# Patient Record
Sex: MALE | Race: WHITE
[De-identification: names, ages, dates, MRNs, and addresses within clinical notes are randomized per-mention and may not be internally consistent; named-entity substitution may affect disease eponyms.]

---

## 2019-04-13 ENCOUNTER — HOSPITAL ENCOUNTER (EMERGENCY)
Dept: HOSPITAL 41 - JD.ED | Age: 29
Discharge: HOME | End: 2019-04-13
Payer: SELF-PAY

## 2019-04-13 VITALS — DIASTOLIC BLOOD PRESSURE: 80 MMHG | SYSTOLIC BLOOD PRESSURE: 135 MMHG

## 2019-04-13 DIAGNOSIS — H10.33: Primary | ICD-10-CM

## 2019-04-13 DIAGNOSIS — F17.210: ICD-10-CM

## 2019-04-13 NOTE — EDM.PDOC
ED HPI GENERAL MEDICAL PROBLEM





- General


Chief Complaint: Eye Problems


Stated Complaint: EYE PROBLEM


Time Seen by Provider: 04/13/19 21:36


Source of Information: Reports: Patient





- History of Present Illness


INITIAL COMMENTS - FREE TEXT/NARRATIVE: 


Alfredo Waite presents to our ED with bilateral eye pain and drainage.  Reports 

symptoms started in his left eye when he woke this morning they been itchy and 

painful.  He is also noticed some greenish yellow discharge.  Reports symptoms 

then moved to his right eye although his left eye is still worse.  He has no 

known sick contacts and reports that no but he's been around has had pinkeye 

that he is aware of.  Denies any other infectious symptoms.  He has no 

allergies.  He has no medications.  He does not wear contact lenses.  Denies 

any nausea, vomiting, chest pain, shortness of breath, congestion, ear pain.








- Related Data


 Allergies











Allergy/AdvReac Type Severity Reaction Status Date / Time


 


No Known Allergies Allergy   Verified 04/13/19 21:28











Home Meds: 


 Home Meds





Tobramycin 0.3% [Tobramycin 0.3% Ophth Soln] 1 drop OP QID 7 Days #1 bottle 04/ 13/19 [Rx]











Past Medical History





- Past Surgical History


GI Surgical History: Reports: Hernia, Abdominal


Male  Surgical History: Reports: Other (See Below)





Social & Family History





- Tobacco Use


Smoking Status *Q: Current Every Day Smoker


Years of Tobacco use: 13


Packs/Tins Daily: 2





- Caffeine Use


Caffeine Use: Reports: Coffee





- Recreational Drug Use


Recreational Drug Use: No





- Living Situation & Occupation


Living situation: Reports: Single, with Family





ED ROS GENERAL





- Review of Systems


Review Of Systems: ROS reveals no pertinent complaints other than HPI.





ED EXAM GENERAL W FULL EYE





- Physical Exam


Exam: See Below


Exam Limited By: No Limitations


General Appearance: Alert, WD/WN, No Apparent Distress


Eye Exam: Bilateral Eye: Conjunctival Injection, EOMI, PERRL, Other (

Bilaterally eyes are very watery.  There is some greenish discharge noted more 

prevalent on left than right.)


Ears: Normal External Exam


Nose: Normal Inspection





Course





- Vital Signs


Last Recorded V/S: 





 Last Vital Signs











Temp  98.4 F   04/13/19 21:24


 


Pulse  76   04/13/19 21:24


 


Resp  16   04/13/19 21:24


 


BP  135/80   04/13/19 21:24


 


Pulse Ox  95   04/13/19 21:24














Departure





- Departure


Time of Disposition: 21:39


Disposition: Home, Self-Care 01


Clinical Impression: 


Conjunctivitis


Qualifiers:


 Conjunctivitis type: acute Acute conjunctivitis type: unspecified Laterality: 

bilateral Qualified Code(s): H10.33 - Unspecified acute conjunctivitis, 

bilateral








- Discharge Information


*PRESCRIPTION DRUG MONITORING PROGRAM REVIEWED*: No


*COPY OF PRESCRIPTION DRUG MONITORING REPORT IN PATIENT STEPHANIE: No


Prescriptions: 


Tobramycin 0.3% [Tobramycin 0.3% Ophth Soln] 1 drop OP QID 7 Days #1 bottle


Instructions:  Bacterial Conjunctivitis, Easy-to-Read, How to Use Eye Drops and 

Eye Ointments


Referrals: 


PCP,None [Primary Care Provider] - 


Additional Instructions: 


You presented to our ED today and were diagnosed with bilateral conjunctivitis, 

also known as "pink eye."  An antibiotic eyedrop was sent to the pharmacy in 

family fare.  He should take this antibiotic 4 times a day into both of your 

eyes being careful not to touch her eyeball with the and of the bottle.  He 

should continue this for 7 days.  Be sure to wash her hands frequently and 

avoid itching or rubbing her eyes as this is easily spread.  Should symptoms 

continue or worsen return to the ED or contact your primary care provider.

## 2020-02-10 NOTE — CR
Chest: 2 views of the chest are obtained.

 

Comparison: Prior chest x-ray of 03/06/14.

 

Heart size and mediastinum are normal.  Lungs are clear with no acute 

parenchymal change.  Bony structures appear within normal limits for 

the patient's age.

 

Impression:

1.  Nothing acute is seen on 2 view chest x-ray.

 

Diagnostic code #1

 

Study was dictated in [place] Standard Time

## 2020-02-10 NOTE — EDM.PDOC
<Daisy Real - Last Filed: 02/10/20 18:07>





ED HPI GENERAL MEDICAL PROBLEM





- General


Chief Complaint: Respiratory Problem


Stated Complaint: SOB


Time Seen by Provider: 02/10/20 17:37


Source of Information: Reports: Patient


History Limitations: Reports: No Limitations





- History of Present Illness


INITIAL COMMENTS - FREE TEXT/NARRATIVE: 





Patient is a pleasant 29-year-old male who presents with complaints of 

shortness of breath and a dry cough that has been present for 3-4 weeks.  He 

states he started having the trouble with his breathing soon after he quit 

smoking cigarettes one month ago.  When he quit smoking cigarettes, he started 

vaping and states he probably vapes more nicotine than he did when he smoked 

the cigarettes.  He states the shortness of breath worsens when he lays flat 

and that the shortness of breath wakes him up at night.  He reports chest pain 

due to the coughing episodes he has been experiencing.  He reports he has been 

using his mother's albuterol nebulizers every 6 hours for the shortness of 

breath for the past few weeks and has used her albuterol inhaler a few times as 

well.  He does report a history of asthma when he was a child but has not had 

to use an albuterol inhaler in over eight years.  Denies fever, chills, and 

abdominal pain. 


Onset: Gradual


Duration: Week(s):, Constant





- Related Data


 Allergies











Allergy/AdvReac Type Severity Reaction Status Date / Time


 


No Known Allergies Allergy   Verified 04/13/19 21:28











Home Meds: 


 Home Meds





Tobramycin 0.3% [Tobramycin 0.3% Ophth Soln] 1 drop OP QID 7 Days #1 bottle 04/ 13/19 [Rx]


predniSONE [Prednisone] 40 mg PO DAILY #10 tablet 02/10/20 [Rx]











Past Medical History





- Past Surgical History


GI Surgical History: Reports: Hernia, Abdominal


Male  Surgical History: Reports: Other (See Below)





Social & Family History





- Caffeine Use


Caffeine Use: Reports: Coffee





- Living Situation & Occupation


Living situation: Reports: Single, with Family





ED ROS GENERAL





- Review of Systems


Review Of Systems: See Below


Constitutional: Reports: No Symptoms.  Denies: Fever, Chills, Weakness, Fatigue


HEENT: Reports: No Symptoms.  Denies: Throat Pain


Respiratory: Reports: Shortness of Breath, Cough (non-productive).  Denies: 

Wheezing, Hemoptysis


Cardiovascular: Reports: Chest Pain (with coughing).  Denies: Edema, 

Lightheadedness, Syncope


GI/Abdominal: Reports: No Symptoms.  Denies: Abdominal Pain, Diarrhea, Nausea, 

Vomiting


Musculoskeletal: Reports: No Symptoms.  Denies: Neck Pain, Back Pain


Skin: Reports: No Symptoms.  Denies: Rash, Erythema


Neurological: Reports: No Symptoms.  Denies: Dizziness, Headache, Syncope


Psychiatric: Reports: No Symptoms





ED EXAM, GENERAL





- Physical Exam


Exam: See Below


Exam Limited By: No Limitations


General Appearance: Alert, WD/WN, No Apparent Distress


Head: Atraumatic, Normocephalic


Neck: Normal Inspection, Supple, Non-Tender, Full Range of Motion


Respiratory/Chest: No Respiratory Distress, No Accessory Muscle Use, Chest Non-

Tender.  No: Rales, Rhonchi, Wheezing (at end of expiration in upper lobes)


Cardiovascular: Normal Peripheral Pulses, Regular Rate, Rhythm, No Edema, No 

Gallop, No Murmur, No Rub


GI/Abdominal: Normal Bowel Sounds, Soft, Non-Tender, No Organomegaly, No 

Distention, No Mass


Back Exam: Normal Inspection, Full Range of Motion, NT


Extremities: Normal Inspection, Normal Range of Motion, Non-Tender, Normal 

Capillary Refill, No Pedal Edema


Neurological: Alert, Oriented, Normal Cognition, Normal Gait, No Motor/Sensory 

Deficits


Psychiatric: Normal Affect, Normal Mood


Skin Exam: Warm, Dry, Intact, Normal Color, No Rash


Lymphatic: No Adenopathy





Course





- Vital Signs


Last Recorded V/S: 





 Last Vital Signs











Temp  98.5 F   02/10/20 17:02


 


Pulse  79   02/10/20 17:02


 


Resp  16   02/10/20 17:02


 


BP  111/77   02/10/20 17:02


 


Pulse Ox  100   02/10/20 18:08














- Orders/Labs/Meds


Orders: 





 Active Orders 24 hr











 Category Date Time Status


 


 RT Aerosol Therapy [RC] ASDIRECTED Care  02/10/20 17:57 Active











Meds: 





Medications














Discontinued Medications














Generic Name Dose Route Start Last Admin





  Trade Name Freq  PRN Reason Stop Dose Admin


 


Albuterol/Ipratropium  3 ml  02/10/20 17:57  02/10/20 18:07





  Duoneb 3.0-0.5 Mg/3 Ml  NEB  02/10/20 17:58  3 ml





  ONETIME ONE   Administration





     





     





     





     


 


Prednisone  40 mg  02/10/20 17:57  02/10/20 18:04





  Prednisone  PO  02/10/20 17:58  40 mg





  ONETIME ONE   Administration





     





     





     





     














Departure





- Departure


Disposition: Home, Self-Care 01


Clinical Impression: 


Reactive airway disease


Qualifiers:


 Asthma severity: moderate Asthma persistence: persistent Asthma complication 

type: with acute exacerbation Qualified Code(s): J45.41 - Moderate persistent 

asthma with (acute) exacerbation








- Discharge Information


Prescriptions: 


predniSONE [Prednisone] 40 mg PO DAILY #10 tablet


Referrals: 


PCP,None [Primary Care Provider] - 


Sonia Lee PA-C [Physician Assistant] - 1 Week


Forms:  ED Department Discharge


Additional Instructions: 


Try to stop vaping.  Take the prednisone 40mg daily for 5 days starting 

tomorrow.  Use the albuterol nebs every 6 hours.  Please return if you are 

worse.





Sepsis Event Note





- Evaluation


Sepsis Screening Result: No Definite Risk





- Focused Exam


Vital Signs: 





 Vital Signs











  Temp Pulse Resp BP Pulse Ox Pulse Ox


 


 02/10/20 18:08       100


 


 02/10/20 17:02  98.5 F  79  16  111/77  97 











Date Exam was Performed: 02/10/20


Time Exam was Performed: 18:07





- My Orders


Last 24 Hours: 





My Active Orders





02/10/20 17:57


RT Aerosol Therapy [RC] ASDIRECTED 














- Assessment/Plan


Last 24 Hours: 





My Active Orders





02/10/20 17:57


RT Aerosol Therapy [RC] ASDIRECTED 














<Olu Mcneil - Last Filed: 02/10/20 19:46>





Course





- Re-Assessments/Exams


Free Text/Narrative Re-Assessment/Exam: 





02/10/20 19:39


I examined the patient myself and I agree with Daisy's assessment and plan.  I 

ordered a duoneb, prednisone and a CXR.  His CXR looks good.  I will need to 

get him on some prednisone.





Departure





- Departure


Time of Disposition: 19:45


Condition: Good





- Discharge Information


*PRESCRIPTION DRUG MONITORING PROGRAM REVIEWED*: Not Applicable


*COPY OF PRESCRIPTION DRUG MONITORING REPORT IN PATIENT STEPHANIE: Not Applicable





Sepsis Event Note





- Focused Exam


Date Exam was Performed: 02/10/20


Time Exam was Performed: 19:39

## 2020-07-27 NOTE — EDM.PDOC
ED HPI GENERAL MEDICAL PROBLEM





- General


Chief Complaint: Respiratory Problem


Stated Complaint: SOB


Time Seen by Provider: 07/27/20 11:05


Source of Information: Reports: Patient


History Limitations: Reports: No Limitations





- History of Present Illness


INITIAL COMMENTS - FREE TEXT/NARRATIVE: 


30-year-old male presents to the ED with significant wheezing and dyspnea at 

rest.  Patient symptoms date back to at least December of last year.  He was 

worse in the month of January when he was exposed to cool air.  He was seen in 

the ED on the month of February and prescribed a short 5-day course of steroids 

which did help immensely.  Patient states that over the last few days he has 

become more short of breath and wheezy.  Albuterol nebulizer machine at home as 

well as metered-dose inhaler have not been helping resolve the wheezing.  He 

denies any upper respiratory tract infection cough or sputum production.  No 

known exposure to COVID-19 virus.  He reports he is still smoking a pack per day

of cigarettes.  Does have a mild smoker's cough.  He has an appointment to see 

Dr. Augusto Sears in 3 days time but due to inability to sleep all night and 

increased shortness of breath elected to come to the ED for evaluation.  O2 sats

were 93 to 95% on room air.





Onset: Gradual


Onset Date: 07/25/20 (Tonic problems with asthma symptoms i.e. shortness of 

breath and wheezing much worse the last 3 days.)


Duration: Chronic, Getting Worse


Location: Reports: Chest (Rest of breath with audible wheezing.)


Quality: Reports: Pressure (Central pressure in upper chest.)


Severity: Severe


Improves with: Reports: Rest, Other (Transient relief of symptoms with albuterol

metered-dose inhaler and home nebulizer albuterol.)


Worsens with: Reports: Other


Context: Denies: Activity, Exercise (This with exertion.), Lifting, Sick 

Contact, Trauma, Other


Associated Symptoms: Reports: Chest Pain, Cough (Nonproductive cough related to 

smoking.), Malaise, Shortness of Breath.  Denies: No Other Symptoms, Confusion 

(Trolled chest pressure discomfort), cough w sputum, Diaphoresis, Fever/Chills, 

Headaches, Loss of Appetite, Nausea/Vomiting, Rash (Not sleeping all night), 

Seizure, Syncope, Weakness


Treatments PTA: Reports: Other (see below) (Butyryl metered-dose inhaler and 

home nebulizer treatment 6 times daily)





- Related Data


                                    Allergies











Allergy/AdvReac Type Severity Reaction Status Date / Time


 


No Known Allergies Allergy   Verified 07/27/20 10:19











Home Meds: 


                                    Home Meds





Albuterol Sulfate [Albuterol Sulfate Hfa] 2 puff INH 6XDAY PRN 07/27/20 

[History]


Albuterol [Proventil Neb Soln] 3 ml NEB 6XDAY PRN 07/27/20 [History]


Fluticasone Propionate [Flovent  MCG] 2 puff INH BID #1 inhaler 07/27/20 

[Rx]


predniSONE [Prednisone] 20 mg PO ASDIRECTED #18 tablet 07/27/20 [Rx]











Past Medical History


Respiratory History: Reports: Asthma


Other Respiratory History: asthma as a child





- Past Surgical History


GI Surgical History: Reports: Hernia, Abdominal


Male  Surgical History: Reports: Other (See Below)


Musculoskeletal Surgical History: Reports: Other (See Below)


Other Musculoskeletal Surgeries/Procedures:: Surgery to finger on left hand





Social & Family History





- Family History


Family Medical History: Noncontributory





- Tobacco Use


Smoking Status *Q: Current Every Day Smoker


Years of Tobacco use: 14


Packs/Tins Daily: 1





- Caffeine Use


Caffeine Use: Reports: Coffee, Energy Drinks, Soda, Tea





- Recreational Drug Use


Recreational Drug Use: Yes


Drug Use in Last 12 Months: No


Recreational Drug Type: Reports: Marijuana/Hashish


Recreational Drug Use Frequency: Not Used In Over 6 Months





- Living Situation & Occupation


Living situation: Reports: Single, with Family





ED ROS GENERAL





- Review of Systems


Review Of Systems: See Below


Constitutional: Reports: Malaise, Fatigue, Decreased Appetite (Not sleeping all 

night.).  Denies: Fever, Chills


HEENT: Reports: No Symptoms


Respiratory: Reports: Shortness of Breath, Wheezing, Cough.  Denies: Pleuritic 

Chest Pain, Sputum, Hemoptysis (No worse than usual.  Related to smoking.)


Cardiovascular: Reports: Chest Pain, Dyspnea on Exertion.  Denies: Blood 

Pressure Problem (Central chest pressure discomfort.), Claudication, Edema, 

Lightheadedness, Orthopnea, Palpitations


Endocrine: Reports: Fatigue


GI/Abdominal: Reports: No Symptoms


: Reports: No Symptoms


Musculoskeletal: Reports: No Symptoms


Skin: Reports: No Symptoms


Neurological: Reports: No Symptoms


Psychiatric: Reports: No Symptoms


Hematologic/Lymphatic: Reports: No Symptoms


Immunologic: Reports: No Symptoms





ED EXAM, GENERAL





- Physical Exam


Exam: See Below


Exam Limited By: No Limitations


General Appearance: Alert, WD/WN, Moderate Distress, Other (Bigelow distress.  

She is 36.6.  Heart rate 65 and sinus respiratory of 18 with sats of 93 to 95% 

on room air.  /79.)


Eye Exam: Bilateral Eye: Normal Inspection, PERRL


Ears: Normal TMs


Nose: Nasal Flaring


Throat/Mouth: Normal Inspection, Normal Lips, Normal Oropharynx, Other (Normal 

sized tonsils.)


Head: Atraumatic, Normocephalic


Neck: Normal Inspection, Supple, Non-Tender, Full Range of Motion.  No: Carotid 

Bruit, Lymphadenopathy (L), Lymphadenopathy (R)


Respiratory/Chest: No Accessory Muscle Use, Chest Non-Tender, Respiratory 

Distress, Decreased Breath Sounds (Lungs are diminished to the lower 40% of lung

 fields bilaterally.), Wheezing (Diffuse wheezing in all lung fields on 

expiration).  No: Lungs Clear, Normal Breath Sounds


Cardiovascular: Normal Peripheral Pulses, Regular Rate, Rhythm, No Edema, No 

Gallop, No Murmur, No Rub


Peripheral Pulses: 3+: Carotid (L), Carotid (R), Posterior Tibial (L), Posterior

 Tibial (R), Dorsalis Pedis (L), Dorsalis Pedis (R)


GI/Abdominal: Normal Bowel Sounds, Soft, Non-Tender, No Organomegaly, No 

Abnormal Bruit, No Mass, Pelvis Stable, Other (No surgical scars)


 (Male) Exam: No Hernia


Back Exam: Normal Inspection, Full Range of Motion.  No: CVA Tenderness (L), CVA

 Tenderness (R)


Extremities: Normal Inspection, Normal Range of Motion, Non-Tender, No Pedal 

Edema


Neurological: Alert, Oriented, CN II-XII Intact, Normal Cognition


Psychiatric: Normal Affect, Normal Mood


Skin Exam: Warm, Dry, Intact, Normal Color, No Rash





Course





- Vital Signs


Last Recorded V/S: 


                                Last Vital Signs











Temp  36.6 C   07/27/20 10:14


 


Pulse  65   07/27/20 10:14


 


Resp  18   07/27/20 10:14


 


BP  114/79   07/27/20 10:14


 


Pulse Ox  90 L  07/27/20 12:07














- Orders/Labs/Meds


Orders: 


                               Active Orders 24 hr











 Category Date Time Status


 


 RT Aerosol Therapy [RC] ASDIRECTED Care  07/27/20 11:21 Active


 


 Chest 1V Frontal [CR] Stat Exams  07/27/20 11:22 Taken











Meds: 


Medications














Discontinued Medications














Generic Name Dose Route Start Last Admin





  Trade Name Percy  PRN Reason Stop Dose Admin


 


Albuterol/Ipratropium  3 ml  07/27/20 11:21  07/27/20 12:53





  Duoneb 3.0-0.5 Mg/3 Ml  NEB   3 ml





  Q4H PRN   Administration





  Shortness Of Breath/wheezing  


 


Prednisone  30 mg  07/27/20 11:26  07/27/20 12:31





  Prednisone  PO  07/27/20 11:27  30 mg





  ONETIME ONE   Administration














- Radiology Interpretation


Free Text/Narrative:: 


 30-year-old male resents to the ED with acute exacerbation of what sounds like 

chronic asthma.  Sounds like he has been having symptoms of asthma with wheezing

 shortness of breath attacks since December of last year.  In spite of this he 

continues to smoke a pack of cigarettes per day..  Symptoms are much worse over 

the last 3 days.  He was up all night due to dyspnea and inability to lie flat 

due to shortness of breath.  No improvement with home nebulizer medicine every 4

 hours i.e. albuterol.  Metered-dose inhaler has not been helping at all either.

  Patient has been placed on one course of steroids in February of this year for

 5 days which did improve his symptoms.  Minimal cough at this time he is 

afebrile.  O2 sats are 93 to 95% on room air.  Exam reveals him to be wheezing 

throughout all lung fields with decreased air entry to both lower lung fields 

posteriorly.  Plan 1 view chest x-ray to be done.  Pulmonary function studies to

 be done.  He will have a DuoNeb treatment now and likely require repeat in 20 

minutes or so.  Prednisone 30 mg by mouth to be given now.








- Re-Assessments/Exams


Free Text/Narrative Re-Assessment/Exam: 





07/27/20 13:00: He is feeling much improved after being treated with DuoNeb and 

albuterol during his lung function studies.  Function studies are definitely 

positive for obstructive airway disease.  The FVC, FEV1, FEV1, FVC ratio and FEF

 F 25 to 75% of predicted normal there was a significant response to 

administered albuterol.  And the patient will be discharged on Flovent 110 

mcg/puff.  He will use 2 puffs morning and bedtime until he regains control of 

his asthma.  He is to continue use albuterol either via metered-dose inhaler or 

nebulizer as needed every 2 hours and.  Placed on prednisone 20 mg twice daily 

for the next 6 days and then 1 tablet the morning only for another 6 days to 

bring acute relief to his dyspnea and wheezing.  He is strongly encouraged that 

he must stop smoking as he has the lungs of an 82-year-old at present.  Advised 

to follow-up with Dr. Augusto Sears in 3 days time as planned as he will need 

someone to  refill his medications and monitor his response to steroid therapy. 

 there are financial barriers to his treatment plan as he does not have medical 

insurance at this time.








Departure





- Departure


Time of Disposition: 12:50


Disposition: Home, Self-Care 01


Condition: Fair


Clinical Impression: 


 Asthma attacks lasting more than 24 hours








- Discharge Information


*PRESCRIPTION DRUG MONITORING PROGRAM REVIEWED*: Not Applicable


*COPY OF PRESCRIPTION DRUG MONITORING REPORT IN PATIENT STEPHANIE: Not Applicable


Prescriptions: 


Fluticasone Propionate [Flovent  MCG] 2 puff INH BID #1 inhaler


predniSONE [Prednisone] 20 mg PO ASDIRECTED #18 tablet


Instructions:  Steps to Quit Smoking, Easy-to-Read, Asthma, Adult


Referrals: 


Augusto Sears Jr, MD [Primary Care Provider] - 


Forms:  ED Department Discharge


Additional Instructions: 


[facility] in the emergency room today in regards to symptoms of asthma dating back to 

at least December of last year.  Intermittent worsening of symptoms particularly

over the last 3 days and particular last night where you are unable to sleep at 

all due to shortness of breath and wheezing.  Emanation reveals expiratory 

wheezing throughout all lung fields with O2 sats of 93 to 95% on room air with 

normal being 97 to 98%.  Kehinde function studies reveal significant obstructive 

airway disease confirming fairly severe asthma.  Treated with DuoNeb and 

albuterol while in the ED.  Modest improvement did occur with these medications 

in regards to airflow in your lungs.  Initial dose of prednisone was given in 

the ED 30 mg by mouth.  You need another 20 mg tonight at bedtime about 10 or 

11:00 tonight.  After this it is to be taken twice daily with breakfast and 

supper for the next 6 days and then once in the morning only for another 6 days 

to bring asthma symptoms under control.  You are also going to need a steroid 

inhaler and suggest Flovent 110 mcg inhaler using 2 puffs in the morning and 2 

puffs at bedtime to bring your asthma under control long-term.  It should get to

the point where you would rarely need your rescue inhaler or have to use your 

home nebulizer for treatment.  It should also mean you never get up at nighttime

to use your inhaler be due to shortness of breath.  Suggest follow-up with Dr. Sears as planned in 3 days time as he will have to follow you along and make 

sure that you respond to the above treatment and to refill medications as 

needed.





Sepsis Event Note (ED)





- Evaluation


Sepsis Screening Result: No Definite Risk





- Focused Exam


Vital Signs: 


                                   Vital Signs











  Temp Pulse Resp BP Pulse Ox Pulse Ox


 


 07/27/20 12:07       90 L


 


 07/27/20 10:14  36.6 C  65  18  114/79  94 L 














- My Orders


Last 24 Hours: 


My Active Orders





07/27/20 11:21


RT Aerosol Therapy [RC] ASDIRECTED 





07/27/20 11:22


Chest 1V Frontal [CR] Stat 














- Assessment/Plan


Last 24 Hours: 


My Active Orders





07/27/20 11:21


RT Aerosol Therapy [RC] ASDIRECTED 





07/27/20 11:22


Chest 1V Frontal [CR] Stat

## 2020-07-27 NOTE — CR
Chest: PA view of the chest was obtained.

 

Comparison: Prior chest x-ray of 02/10/20.

 

Heart size and mediastinum are normal.  Lungs are clear with no acute 

parenchymal change.  Bony structures are grossly intact.

 

Impression:

1.  Nothing acute is seen on PA chest x-ray.

 

Diagnostic code #1

 

This report was dictated in MDT

## 2021-05-05 NOTE — EDM.PDOC
ED HPI GENERAL MEDICAL PROBLEM





- General


Chief Complaint: Skin Complaint


Stated Complaint: SWOLLEN BOTTOM LIP


Time Seen by Provider: 05/05/21 06:40


Source of Information: Reports: Patient


History Limitations: Reports: No Limitations





- History of Present Illness


INITIAL COMMENTS - FREE TEXT/NARRATIVE: 





Patient arrived ED by private vehicle





Complains of swelling of the lower lip


States that he squeezed a small pimple underneath the right lower lip 2 days ago


The next day he awoke with swelling of the right lower lip


Overnight the swelling has spread across the lower lip


Pain severity is rated "between 4 and 6"


Denies dyspnea, dysphagia, or swelling of tongue or throat


Denies fever, nausea, vomiting


Denies prior history of similar symptoms








  ** Lip


Pain Score (Numeric/FACES): 3





- Related Data


                                    Allergies











Allergy/AdvReac Type Severity Reaction Status Date / Time


 


No Known Allergies Allergy   Verified 05/06/21 10:34











Home Meds: 


                                    Home Meds





Albuterol Sulfate [Albuterol Sulfate Hfa] 2 puff INH 6XDAY PRN 07/27/20 

[History]


Albuterol [Proventil Neb Soln] 3 ml NEB 6XDAY PRN 07/27/20 [History]


Fluticasone Propionate [Flovent  MCG] 2 puff INH BID #1 inhaler 07/27/20 

[Rx]


Amoxicillin/Clavulanate K [Augmentin 875-125 MG] 1 tab PO Q12H #14 tab 05/05/21 

[Rx]


Escitalopram Oxalate [Lexapro] 20 mg PO BEDTIME 05/05/21 [History]


oxyCODONE HCl/Acetaminophen [Percocet 5-325 mg Tablet] 1 - 2 each PO Q4H PRN #12

tablet 05/06/21 [Rx]











Past Medical History


Respiratory History: Reports: Asthma


Other Respiratory History: asthma as a child





- Past Surgical History


GI Surgical History: Reports: Hernia, Abdominal


Male  Surgical History: Reports: Other (See Below)


Other Male  Surgeries/Procedures: Undescended testicle


Musculoskeletal Surgical History: Reports: Other (See Below)


Other Musculoskeletal Surgeries/Procedures:: Surgery to finger on left hand





Social & Family History





- Family History


Family Medical History: No Pertinent Family History





- Caffeine Use


Caffeine Use: Reports: Coffee





- Recreational Drug Use


Recreational Drug Use: Yes


Recreational Drug Type: Reports: Marijuana/Hashish





- Living Situation & Occupation


Living situation: Reports: Single, with Family





ED ROS GENERAL





- Review of Systems


Review Of Systems: See Below


Free Text/Narrative/Comment: 





Constitutional - no fever


ENT - no rhinorrhea; no congestion; no epistaxis; lip swelling; no dental pain; 

no throat or tongue swelling


Respiratory - no shortness of breath


Gastrointestinal - no nausea; no vomiting


Integumentary - no rash; no urticaria











ED EXAM, SKIN/RASH


Exam: See Below


Text/Narrative:: 





Constitutional - awake; alert; no acute distress


Head - no facial swelling or weakness


Eyes - extra ocular motion intact; conjunctiva normal


ENT - no nasal deformity; no epistaxis; normal phonation; oropharynx normal; 

moderate, diffuse swelling of lower lip with palpable nodular or cystic lesion 

within right lower lip; no gingival lesions; no perioral edema, induration, or 

erythema; small, 1 mm scab on skin under vermilion border of right lower lip 


Neck - no swelling


Respiratory - normal respiratory effort


Musculoskeletal - grossly normal strength and motion


Skin - warm; dry


Neurologic - normal speech; gait intact


Psychiatric - normal mood and affect; memory and attention normal








Course





- Vital Signs


Text/Narrative:: 





.


Considered etiologies included:


lip swelling, abscess, cellulitis, angioedema





Symptoms and examination were discussed


ED investigations were felt to be of limited utility


In consideration of possible abscess/infection, patient was given a dose of 

amoxicillin/clavulanate


Subsequent research by writer determined reports of angioedema associated with 

escitalopram and other SSRI medications


Discontinuation of escitalopram was felt to be prudent


patient was advised to defer antibiotic therapy pending short-term clinical 

observation





0750 - Case was reviewed with Dr Belcher (covering patient's primary care group)

 to facilitate short-term follow-up and discuss alternate antidepressant therapy





Patient was felt to be stable for outpatient follow-up


Return precautions were provided








Last Recorded V/S: 


                                Last Vital Signs











Temp  35.8 C L  05/05/21 06:33


 


Pulse  55 L  05/05/21 06:33


 


Resp  16   05/05/21 06:33


 


BP  130/78   05/05/21 06:33


 


Pulse Ox  99   05/05/21 06:33














- Orders/Labs/Meds


Meds: 


Medications














Discontinued Medications














Generic Name Dose Route Start Last Admin





  Trade Name Freq  PRN Reason Stop Dose Admin


 


Amoxicillin/Clavulanate Potassium  1 tab  05/05/21 07:02  05/05/21 07:08





  Amoxicillin/Clavulanate K 875-125 Mg Tab  PO  05/05/21 07:03  1 tab





  ONETIME ONE   Administration














Departure





- Departure


Time of Disposition: 08:07


Disposition: Home, Self-Care 01


Condition: Good


Clinical Impression: 


Angioedema


Qualifiers:


 Encounter type: initial encounter Qualified Code(s): T78.3XXA - Angioneurotic 

edema, initial encounter








- Discharge Information


Prescriptions: 


Amoxicillin/Clavulanate K [Augmentin 875-125 MG] 1 tab PO Q12H #14 tab


Instructions:  Angioedema


Referrals: 


PCP,None [Primary Care Provider] - 


Forms:  ED Department Discharge


Additional Instructions: 


Your lip swelling may be due to ANGIOEDEMA, which is caused by the body's immune

system


This is a rare reaction, which could be associated with Lexapro





May resume general activity and regular diet as tolerated


Stop taking ESCITALOPRAM (Lexapro) 


May start AMOXICILLIN/CLAVULANATE antibiotic for possible infection, if lip swe

lling is not improving over next 48 hours


Follow-up with primary care provider is recommended in 2-3 days, for 

reevaluation of lip swelling and considerations of a replacement for the Lexapro








Sepsis Event Note (ED)





- Evaluation


Sepsis Screening Result: No Definite Risk

## 2021-05-06 NOTE — EDM.PDOC
ED HPI GENERAL MEDICAL PROBLEM





- General


Chief Complaint: ENT Problem


Stated Complaint: SWOLLEN LIP IS WORSENING


Time Seen by Provider: 05/06/21 10:33


Source of Information: Reports: Patient


History Limitations: Reports: No Limitations





- History of Present Illness


INITIAL COMMENTS - FREE TEXT/NARRATIVE: 


30-year-old male presents to the ED with increased swelling of his entire lower 

lip.  Patient states that 3 days ago he had a pimple on the chin just below his 

lip and he squeezed it hard to break it open.  Since then he has developed 

increased pain and swelling of the entire lower lip.  He also reports that he 

did bite the inside of his lower lip 3 to 4 days ago.  He was seen in the ED 

early yesterday morning by Dr. Rodriguez and prescribed Augmentin 875/1 2 5 mg 

twice daily.  Patient states that over the last 24 hours the lip is become so 

painful that it is throbbing and interfering with ability to sleep and eat.  He 

states pain is 10 out of 10 this morning.  He states the lower lip has become 

much more swollen over the last 24 hours.  He has no systemic signs of infection

fever chills nausea or vomiting.





Onset: Gradual


Onset Date: 05/03/21


Duration: Day(s):, Getting Worse


Location: Reports: Face (Entire lower lip is obviously swollen and indurated.)


Quality: Reports: Ache, Throbbing, Other (Pulsating)


Severity: Moderate (8 out of 10.)


Improves with: Reports: Medication (Motrin helped only minimally.)


Worsens with: Reports: Other (Touching the area.)


Context: Denies: Activity, Exercise, Lifting, Sick Contact, Trauma, Other


Associated Symptoms: Reports: No Other Symptoms


Treatments PTA: Reports: NSAIDS (Motrin.)


  ** Lip


Pain Score (Numeric/FACES): 10





- Related Data


                                    Allergies











Allergy/AdvReac Type Severity Reaction Status Date / Time


 


No Known Allergies Allergy   Verified 05/06/21 10:34











Home Meds: 


                                    Home Meds





Albuterol Sulfate [Albuterol Sulfate Hfa] 2 puff INH 6XDAY PRN 07/27/20 

[History]


Albuterol [Proventil Neb Soln] 3 ml NEB 6XDAY PRN 07/27/20 [History]


Fluticasone Propionate [Flovent  MCG] 2 puff INH BID #1 inhaler 07/27/20 

[Rx]


Amoxicillin/Clavulanate K [Augmentin 875-125 MG] 1 tab PO Q12H #14 tab 05/05/21 

[Rx]


Escitalopram Oxalate [Lexapro] 20 mg PO BEDTIME 05/05/21 [History]


oxyCODONE HCl/Acetaminophen [Percocet 5-325 mg Tablet] 1 - 2 each PO Q4H PRN #12

tablet 05/06/21 [Rx]











Past Medical History


Respiratory History: Reports: Asthma


Other Respiratory History: asthma as a child





- Past Surgical History


GI Surgical History: Reports: Hernia, Abdominal


Male  Surgical History: Reports: Other (See Below)


Other Male  Surgeries/Procedures: Undescended testicle


Musculoskeletal Surgical History: Reports: Other (See Below)


Other Musculoskeletal Surgeries/Procedures:: Surgery to finger on left hand





Social & Family History





- Family History


Family Medical History: No Pertinent Family History





- Caffeine Use


Caffeine Use: Reports: Coffee





- Living Situation & Occupation


Living situation: Reports: Single, with Family





ED ROS ENT





- Review of Systems


Review Of Systems: See Below


Constitutional: Reports: Weakness, Fatigue (From not sleeping well.).  Denies: 

Fever, Chills, Malaise


HEENT: Reports: No Symptoms


Respiratory: Reports: No Symptoms


Cardiovascular: Reports: No Symptoms


Endocrine: Reports: No Symptoms


GI/Abdominal: Reports: No Symptoms


: Reports: No Symptoms


Musculoskeletal: Reports: No Symptoms


Skin: Reports: Other (Infection of the upper surface of his chin with severe 

swelling of the entire lower lip with induration.  No obvious redness of the 

chin itself.  He does have a pustule in the mid lip where the abscess is getting

 ready to point.)


Neurological: Reports: No Symptoms


Psychiatric: Reports: Anxiety, Depression


Hematologic/Lymphatic: Reports: No Symptoms





ED EXAM, ENT





- Physical Exam


Exam: See Below


Exam Limited By: No Limitations


General Appearance: Alert, WD/WN, Mild Distress, Other (Patient has obvious 

swelling of his entire lower lip.  Vital signs show temperature 36.3 degrees.  

Heart rate 58 and sinus.  Respiratory of 12 with O2 sats of 98% room air BP 

127/68.)


Mouth/Throat: Lip Swelling (Entire lower lip is severely swollen and indurated. 

 There is a pustule central superior surface of the lip where the abscess is 

getting ready to point.  The chin itself is minimally swollen and not 

erythematous.  There is no obvious abnormalities on palpation of the inner 

aspect of the lower lip.  ), Other (Patient does have a small pustule in the 

midline just inferior to the vermilion border of his lower lip.  This is in the 

distribution of hair follicles suggesting they are the source of the infection.)


Head: Normocephalic, Scalp Abrasions


Neck: Normal Inspection, Supple, Non-Tender, Full Range of Motion.  No: Ly

mphadenopathy (L), Lymphadenopathy (R)


Respiratory/Chest: No Respiratory Distress, Lungs Clear, Normal Breath Sounds, 

No Accessory Muscle Use


Extremities: Normal Inspection, Normal Range of Motion, Non-Tender, No Pedal 

Edema


Neurological: Alert, Oriented, CN II-XII Intact, Normal Cognition


Skin: Warm, Dry, Intact, Normal Color, Other (Patient has marked swelling of the

 entire lower lip.  There is a small pustule just inferior to the vermilion 

border on the superior aspect of his chin in the distribution of hair follicles.

  This likely was the source of the infection versus a sebaceous cyst.)





Course





- Vital Signs


Last Recorded V/S: 


                                Last Vital Signs











Temp  36.3 C   05/06/21 10:28


 


Pulse  72   05/06/21 12:15


 


Resp  12   05/06/21 10:28


 


BP  128/78   05/06/21 12:15


 


Pulse Ox  98   05/06/21 12:15














- Orders/Labs/Meds


Meds: 


Medications














Discontinued Medications














Generic Name Dose Route Start Last Admin





  Trade Name Freq  PRN Reason Stop Dose Admin


 


Hydromorphone HCl  1 mg  05/06/21 10:40  05/06/21 11:03





  Hydromorphone 1 Mg/Ml Syringe  IVPUSH  05/06/21 10:41  1 mg





  ONETIME ONE   Administration


 


Dextrose/Sodium Chloride  1,000 mls @ 500 mls/hr  05/06/21 10:45  05/06/21 11:01





  Dextrose 5%-Normal Saline  IV   500 mls/hr





  ASDIRECTED TAMMY   Administration


 


Clindamycin Phosphate 900 mg/  50 mls @ 100 mls/hr  05/06/21 10:40  05/06/21 

11:08





  Premix  IV  05/06/21 11:09  100 mls/hr





  ONETIME ONE   Administration


 


Ketorolac Tromethamine  30 mg  05/06/21 10:45  05/06/21 11:19





  Ketorolac 30 Mg/Ml Sdv  IVPUSH   30 mg





  ONETIME TAMMY   Administration


 


Ondansetron HCl  4 mg  05/06/21 10:40  05/06/21 11:02





  Ondansetron 4 Mg/2 Ml Sdv  IVPUSH  05/06/21 10:41  4 mg





  ONETIME ONE   Administration














- Radiology Interpretation


Free Text/Narrative:: 


30-year-old male presents to the ED due to increased pain and swelling of his 

entire lower lip.  Patient states he bit the inside of his lip accidentally 

about 4 days ago.  Then he developed a small pimple on the superior surface of 

his chin just inferior to the vermilion border in the midline.  He gave it a 

good squeeze to pop it 2 days ago and subsequently has developed increased pain 

and swelling of the entire lower lip.  Patient was seen in the ED yesterday 

morning early by Dr. Rodriguez.  He was started on Augmentin 875/1 2 5 mg by 

mouth which she is taken 2 tablets.  Patient states the pain at present is 

severe.  He could hardly sleep all night due to pulsation and throbbing of his 

entire lip.  Exam reveals induration and swelling of the entire lower lip.  No 

inside abnormalities appreciated.  No lymphadenopathy.  There is an area where 

it appears to be pointing in the midline of the lip.  Plan IV antibiotic.  He 

will be given clindamycin 900 mg IV.  Toradol 30 mg IV and Dilaudid 1 mg IV for 

pain relief.  Plan will be to continue antibiotic treatment that he has been 

started on.  Will also be provided pain medication until the swelling of his lip

 goes down which will block to be another 48 hours.








- Re-Assessments/Exams


Free Text/Narrative Re-Assessment/Exam: 





05/06/21 12:07 Patient has finished his IV clindamycin on her milligrams 

infusion.  He is feeling much better as far as pain goes.  He will continue the 

Augmentin tablets 875/1 2 5 mg twice daily as previously prescribed by Dr. Rodriguez.  He was given 12 Percocet tablets 1 or 2 every 4-6 hours necessary for

 pain relief over the next couple of days until the swelling of his lip goes 

down.








Departure





- Departure


Time of Disposition: 12:00


Disposition: Home, Self-Care 01


Condition: Fair


Clinical Impression: 


 Cellulitis of vermilion border of lower lip








- Discharge Information


*PRESCRIPTION DRUG MONITORING PROGRAM REVIEWED*: Not Applicable


*COPY OF PRESCRIPTION DRUG MONITORING REPORT IN PATIENT STEPHANIE: Not Applicable


Prescriptions: 


oxyCODONE HCl/Acetaminophen [Percocet 5-325 mg Tablet] 1 - 2 each PO Q4H PRN #12

tablet


 PRN Reason: pain relief. 


Instructions:  Cellulitis, Adult, Ingrown Hair


Referrals: 


Augusto Sears Jr, MD [Primary Care Provider] - 


Forms:  ED Department Discharge


Additional Instructions: 


Evaluation in the emergency room today in regards to worsening infection of the 

entire lower lip.  We call this cellulitis.  Primary infection appears to be a 

infected sebaceous cyst or hair follicle just inferior to the lower lip in the 

midline.  This likely is due to an infected hair follicle as you suggested.  

Continue the Augmentin 875/1 2 5 mg tablets twice daily as prescribed by Dr. Rodriguez yesterday.  Unfortunately the infection spread faster than the 

antibiotics it could take effect.  It usually takes antibiotics 2 days to start 

to work.  In the emergency room today you received antibiotic intravenously.  

This was called clindamycin 900 mg IV.  He also received medication for pain 

relief with Dilaudid 1 mg and Toradol 30 mg IV.  I have written a prescription 

for a few Percocet 5/325 mg tablets 1 or 2 every 4-6 hours necessary for pain 

relief until the swelling and infection comes under control which would likely 

be over the next 48 hours.  Pain medicine should be taken with a little bit of 

food in your stomach and not on empty stomach as it may cause nausea and 

vomiting.  Return to the ED if not markedly improved in the next 48 hours with 

medication.





Sepsis Event Note (ED)





- Evaluation


Sepsis Screening Result: No Definite Risk





- Focused Exam


Vital Signs: 


                                   Vital Signs











  Temp Pulse Resp BP Pulse Ox


 


 05/06/21 12:15   72   128/78  98


 


 05/06/21 12:00   64   120/72 


 


 05/06/21 10:28  36.3 C  57 L  12  127/68  98